# Patient Record
(demographics unavailable — no encounter records)

---

## 2024-11-04 NOTE — HISTORY OF PRESENT ILLNESS
[FreeTextEntry1] : cpe [de-identified] : 48 y/o M pMhx prediabetes here for cpe  Non fasting no complains continues to exercise and follow a healthy life style  HCM: tdap UTD FLU UTD

## 2024-11-04 NOTE — HEALTH RISK ASSESSMENT
[No] : In the past 12 months have you used drugs other than those required for medical reasons? No [0] : 2) Feeling down, depressed, or hopeless: Not at all (0) [Former] : Former [Good] : ~his/her~ current health as good [No falls in past year] : Patient reported no falls in the past year [NO] : No [HIV test declined] : HIV test declined [Hepatitis C test declined] : Hepatitis C test declined [With Significant Other] : lives with significant other [Employed] : employed [College] : College [] :  [Sexually Active] : sexually active [Fully functional (bathing, dressing, toileting, transferring, walking, feeding)] : Fully functional (bathing, dressing, toileting, transferring, walking, feeding) [Smoke Detector] : smoke detector [Carbon Monoxide Detector] : carbon monoxide detector [Seat Belt] :  uses seat belt [Sunscreen] : uses sunscreen [Name: ___] : Health Care Proxy's Name: [unfilled]  [Relationship: ___] : Relationship: [unfilled] [I will adhere to the patient's wishes.] : I will adhere to the patient's wishes. [de-identified] : exercise alternate day [de-identified] : home made food [[id number]] : 0 [de-identified] : > 20 yrs ago  [EyeExamDate] : 01/2024 [Change in mental status noted] : No change in mental status noted [Reports changes in hearing] : Reports no changes in hearing [Reports changes in vision] : Reports no changes in vision [Reports changes in dental health] : Reports no changes in dental health [ColonoscopyDate] : 03/15/2024 [FreeTextEntry2] :  civil [AdvancecareDate] : 11/4/24

## 2025-02-18 NOTE — HISTORY OF PRESENT ILLNESS
[FreeTextEntry1] : f/u [de-identified] : 48 y/o M PMHx prediabetes for f/u  changed diet taking more protein around 100 gm protein eating lots of eggs 4/day, protein powder salads vege has gained 6 lb since last visit , more muscle mass BP well controlled continues to take vitamin D3, now advised with vitamin K Need labs, will get fasting